# Patient Record
Sex: MALE | Race: WHITE | Employment: FULL TIME | URBAN - METROPOLITAN AREA
[De-identification: names, ages, dates, MRNs, and addresses within clinical notes are randomized per-mention and may not be internally consistent; named-entity substitution may affect disease eponyms.]

---

## 2017-12-12 ENCOUNTER — HOSPITAL ENCOUNTER (OUTPATIENT)
Dept: PHYSICAL THERAPY | Age: 52
Discharge: HOME | End: 2017-12-12
Payer: COMMERCIAL

## 2017-12-12 PROCEDURE — 97140 MANUAL THERAPY 1/> REGIONS: CPT | Performed by: PHYSICAL THERAPIST

## 2017-12-12 PROCEDURE — 97162 PT EVAL MOD COMPLEX 30 MIN: CPT | Performed by: PHYSICAL THERAPIST

## 2017-12-12 PROCEDURE — 97110 THERAPEUTIC EXERCISES: CPT | Performed by: PHYSICAL THERAPIST

## 2017-12-12 NOTE — PROGRESS NOTES
Request for use of Dry Needling/Intramuscular Manual Therapy  Patient: Margaret Watson     Referral Source: Calrene Jackson MD  Diagnosis: Lower back pain [M54.5]      : 1965  Date of initial visit: 17   Attended visits: 1  Missed Visits: 0    Based on findings from the physical therapy examination and evaluation, the evaluating therapist believes the patient, Margaret Watson  would benefit from including Dry Needling as part of the plan of care. Dry needling is a treatment technique utilized in conjunction with other PT interventions to inactivate myofascial trigger points and the pain and dysfunction they cause. Dry Needling is an advanced procedure that requires additional training including greater than 54 hours of intensive course work. Physical Therapists at [address] [place] [address] are certified through [address] [place] [address] courses for their education and are certified to perform treatments. PROCEDURE:   Solid filament sterile needle (typically 0.3mm/30 gauge) inserted into a trigger point   Repeated movements inactivate the trigger points, taking 30-60 seconds per site   Typically consists of 1 dry needling session per week and a possible second treatment including muscle re-education, flexibility, strengthening and other manual techniques to facilitate the benefits of dry needling     BENEFITS:   Inactivation of trigger points   Decreased pain   Increased muscle length   Improved movement patterns   Restoration of function POTENTIAL RISKS:   Post-needling soreness   Infection   Bruising/bleeding   Penetration of a nerve   Pneumothorax   All treating PTs have been thoroughly educated in avoiding adverse reactions    If you agree with this recommendation, please sign this form and fax it to us at [phone]. If you have questions or concerns regarding dry needling or any other treatment we may be providing, please contact us at [phone].     Thank you for allowing us to assist in the care of your patient. Joseph ANTONIO Liang    12/12/2017 9:28 AM     NOTE TO PHYSICIAN:  PLEASE COMPLETE THE ORDERS BELOW AND   FAX TO [facility]: [phone]  If you are unable to process this request in 24 hours please contact our office:   [phone] I have read the above request and AGREE to the recommendation of including dry needling as part of the plan of care. ? I have read the above request and DO NOT AGREE to including dry needling as part of the plan of care.   ? I have read the above report and request that my patient continue therapy with the following changes/special instructions:  ________________________________________________________________________    Physicians signature: _______________________________________________     Date: ______________Time:_______________

## 2017-12-12 NOTE — PROGRESS NOTES
[address] [facility] AT [facility]  [address] [place] [address],[address], [place]  Phone [phone]  Fax [phone] / [facility] PHYSICAL THERAPY SERVICES  Patient Name: Zoraida Guzmán : 1965   Medical   Diagnosis: Lower back pain [M54.5] Treatment Diagnosis: Low back pain   Onset Date: chronic     Referral Source: Mango Fitzgerald MD Start of [facility]): 2017   Prior Hospitalization: See medical history Provider #: [id number]   Prior Level of Function: Functional IND   Comorbidities: Heart disease, HBP, arthritis, R knee replacement   Medications: Verified on Patient Summary List   The Plan of Care and following information is based on the information from the initial evaluation.   =================================================================================  Assessment / key information:  Pt is a 47year old male with subjective complaints of R sided hip/low back pain that started 10-12 years ago that recently was flared up 3 months ago. He notes that when he was swinging a golf club 3 months ago he fell to his knees the pain hurts so bad. He had and MRI in 2017 which was negative. Currently he rates his pain a 2/10 on the R side of his low back. Functional limitations include lateral shifting like scooting over in bed. His pain is alleviated with stretching for 45 min. He has not been treated for the low back prior to today. 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 He notes that he tightens his core perform the lift and his back is not affected. Negative for red flags. FOTO: 72/100. Upon evaluation, pt presents with normalized gait pattern. AROM of the lumbar spine: flexion: 100%, extension: unable to extend past 0 neutral. Noted pivot point at T12. Negative repeated movement test of the lumbar spine. Negative for neurodynamic testing of the lumbar spine. Negative hip screen.  Noted tightness in the R QL/Lumbar paraspinals. Pt able to perform PPT with vc's. SI alignment: R upslip/posterior rotation corrected with MET. B hip strength: flexion: 4/.5, abduction: R: 3+/5, L:4+/5, extension: 4+/5. Pt would benefit from a course of skilled PT to address above deficits to return to PLOF symptom free.              =================================================================================  Eval Complexity: History: MEDIUM  Complexity : 1-2 comorbidities / personal factors will impact the outcome/ POC Exam:HIGH Complexity : 4+ Standardized tests and measures addressing body structure, function, activity limitation and / or participation in recreation  Presentation: MEDIUM Complexity : Evolving with changing characteristics  Clinical Decision Making:MEDIUM Complexity : FOTO score of 26-74Overall Complexity:MEDIUM  Problem List: pain affecting function, decrease ROM, decrease strength, decrease ADL/ functional abilitiies, decrease activity tolerance and decrease flexibility/ joint mobility   Treatment Plan may include any combination of the following: Therapeutic exercise, Therapeutic activities, Neuromuscular re-education, Physical agent/modality, Gait/balance training, Manual therapy and Patient education  Patient / Family readiness to learn indicated by: asking questions and trying to perform skills  Persons(s) to be included in education: patient (P)  Barriers to Learning/Limitations: None  Measures taken:    Patient Goal (s): Decrease pain if possible   Patient self reported health status: good  Rehabilitation Potential: good   Short Term Goals: To be accomplished in  1  weeks:  1. Pt will be IND and compliant with HEP for self-management of symptoms.  Long Term Goals: To be accomplished in  4  weeks:  1. Pt will present to clinic for 3 consecutive sessions with level pelvis as an indicator of improved stability. 2. Pt will improve B hip strength to 5/5 to improve gait stability.   3. Pt will be able to swing a golf club with no more than 2/10 pain to return to recreational activities symptom free. 4. Pt will improve FOTO score to at least 77/100 as a functional indicator of improved mobility. Frequency / Duration:   Patient to be seen  2  times per week for 4  weeks:  Patient / Caregiver education and instruction: exercises  G-Codes (GP): JOSE MARIA  Therapist Signature: Radha Bianchi DPT Date: 32/27/2561   Certification Period: NA Time: 9:10 AM   ===========================================================================================  I certify that the above Physical Therapy Services are being furnished while the patient is under my care. I agree with the treatment plan and certify that this therapy is necessary. Physician Signature:        Date:       Time:     Please sign and return to [facility] or you may fax the signed copy to [phone]. Thank you.

## 2017-12-12 NOTE — PROGRESS NOTES
PT DAILY TREATMENT NOTE     Patient Name: Anitra Murray  Date:2017  : 1965  [x]  Patient  Verified  Payor: /    In time:735  Out time:820  Total Treatment Time (min): 39  Visit #: 1 of     Treatment Area: Lower back pain [M54.5]    SUBJECTIVE  Pain Level (0-10 scale): 2/10  Any medication changes, allergies to medications, adverse drug reactions, diagnosis change, or new procedure performed?: [x] No    [] Yes (see summary sheet for update)  Subjective functional status/changes:   [] No changes reported  Pt is a 47year old male with subjective complaints of R sided hip/low back pain that started 10-12 years ago that recently was flared up 3 months ago. He notes that when he was swinging a golf club 3 months ago he fell to his knees the pain hurts so bad. He had and MRI in 2017 which was negative. Currently he rates his pain a 2/10 on the R side of his low back. Functional limitations include lateral shifting like scooting over in bed. His pain is alleviated with stretching for 45 min. He has not been treated for the low back prior to today. 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 He notes that he tightens his core perform the lift and his back is not affected. Negative for red flags. FOTO: 72/100. OBJECTIVE      15 min [x]Eval                  []Re-Eval       20 min Therapeutic Exercise:  [x] See flow sheet : established/educated HEP   Rationale: increase ROM and increase strength to improve the patients ability to improve postural stability with dynamic activities.      10 min Manual Therapy:  MET to correct R Upslip and posterior rotation   Rationale: decrease pain, increase ROM, increase tissue extensibility and decrease trigger points to improve walking tolerance          With   [] TE   [] TA   [] neuro   [] other: Patient Education: [x] Review HEP    [] Progressed/Changed HEP based on:   [] positioning   [] body mechanics   [] transfers   [] heat/ice application    [] other:      Other Objective/Functional Measures:   Upon evaluation, pt presents with normalized gait pattern. AROM of the lumbar spine: flexion: 100%, extension: unable to extend past 0 neutral. Noted pivot point at T12. Negative repeated movement test of the lumbar spine. Negative for neurodynamic testing of the lumbar spine. Negative hip screen. Noted tightness in the R QL/Lumbar paraspinals. Pt able to perform PPT with vc's. SI alignment: R upslip/posterior rotation corrected with MET. B hip strength: flexion: 4/.5, abduction: R: 3+/5, L:4+/5, extension: 4+/5. Pain Level (0-10 scale) post treatment: 1-2/10    ASSESSMENT/Changes in Function:   [x]  See Plan of Care  []  See progress note/recertification  []  See Discharge Summary         Progress towards goals / Updated goals:  PER POC    PLAN  [x]  Upgrade activities as tolerated     [x]  Continue plan of care  [x]  Update interventions per flow sheet       []  Discharge due to:_  [x]  Other: 2x/week for 4 weeks, add dry needling next session    Justification for Eval Code Complexity:  Patient History : chronic pain  Examination see exam   Clinical Presentation: evolving  Clinical Decision Making : FOTO : 72 /100      Olya Hinton DPT 12/12/2017 910  AM    No future appointments.

## 2017-12-13 ENCOUNTER — APPOINTMENT (OUTPATIENT)
Dept: PHYSICAL THERAPY | Age: 52
End: 2017-12-13
Payer: COMMERCIAL

## 2017-12-19 ENCOUNTER — HOSPITAL ENCOUNTER (OUTPATIENT)
Dept: PHYSICAL THERAPY | Age: 52
Discharge: HOME | End: 2017-12-19
Payer: COMMERCIAL

## 2017-12-19 PROCEDURE — 97140 MANUAL THERAPY 1/> REGIONS: CPT | Performed by: PHYSICAL THERAPIST

## 2017-12-19 NOTE — PROGRESS NOTES
PT DAILY TREATMENT NOTE -    Patient Name: Carley Ralph  Date:2017  : 1965  [x]  Patient  Verified  Payor: Bassam Nixon / Plan: [place] [employer]  [facility] Product Type: Commerical /    In time:728  Out time:800  Total Treatment Time (min): 32  Visit #: 2 of 8    Treatment Area: Lower back pain [M54.5]    SUBJECTIVE  Pain Level (0-10 scale): 2/10  Any medication changes, allergies to medications, adverse drug reactions, diagnosis change, or new procedure performed?: [x] No    [] Yes (see summary sheet for update)  Subjective functional status/changes:   [] No changes reported  \"I keep stretching for 45 min a day. Im going to start using the elliptical next week. I have to lose 20 pounds by march!      OBJECTIVE    Modality rationale: PD   Min Type Additional Details    [] Estim: []Att   []Unatt        []TENS instruct                  []IFC  []Premod   []NMES                     []Other:  []w/US   []w/ice   []w/heat  Position:  Location:    []  Traction: [] Cervical       []Lumbar                       [] Prone          []Supine                       []Intermittent   []Continuous Lbs:  [] before manual  [] after manual    []  Ultrasound: []Continuous   [] Pulsed                           []1MHz   []3MHz Location:  W/cm2:    []  Iontophoresis with dexamethasone         Location: [] Take home patch   [] In clinic    []  Ice     []  heat  []  Ice massage Position:  Location:    []  Laser  []  Other: Position:  Location:    []  Vasopneumatic Device Pressure:       [] lo [] med [] hi   Temperature: [] lo [] med [] hi   [] Skin assessment post-treatment:  []intact []redness- no adverse reaction    []redness - adverse reaction:     32 min Manual Therapy: Technique:      [x] S/DTM []IASTM []PROM [] Passive Stretching   [] Graston:  [] GT 1  [] GT 2 [] GT 3 [] GT 4 [] GT 4 [] GT 5  [] GT 6  [] Sweep [] Fan [] [place]  [] Brush   []  Swivel []J- Stroke [] Scoop []IFraming     [x]Manual TPR  [x] TDN (see objective; actual needle insertion time not billed)  []Jt manipulation:Gr I [] II []  III [] IV[] V[]  Treatment/Area:     Rationale:      decrease pain, increase ROM, increase tissue extensibility and decrease trigger points to improve patient's ability to perform job related tasks with reduced pain. With   [] TE   [] TA   [] neuro   [] other: Patient Education: [x] Review HEP    [] Progressed/Changed HEP based on:   [] positioning   [] body mechanics   [] transfers   [] heat/ice application    [] Graston Education: Explained the effects and benefits of Graston Technique therapy including potential for post treatment soreness and bruising. [] Other:      Dry Needling Procedure Note    Dry Needle Session Number:  1    Procedure: An intramuscular manual therapy (dry needling) and a neuro-muscular re-education treatment was done to deactivate myofascial trigger points, with a 15/30 gauge solid filament needle, under aseptic technique. Indication(s): [] Muscle spasms [] Myalgia/Myositis  [] Muscle cramps      [] Muscle imbalances [] TMD (TMJ) [] Myofascial pain & dysfunction     [] Other: __    Chart reviewed for the following:  IAlana DPT, have reviewed the medical history, summary list and precautions/contraindications for Kae Hatch.     TIME OUT performed immediately prior to start of procedure:  8 AM (enter time the timeout was completed)  Alana DAVISON DPT, have performed the following reviews on Kae Hatch prior to the start of the session:      [x] Patient was identified by name and date of birth    [x] Agreement on all muscles being treated was verified   [x] Purpose of dry needling, side effects, possible complications, risks and benefits were explained to the patient   [x] Procedure site(s) verified  [x] Patient was positioned for comfort and draped for privacy  [x] Informed Consent was signed (initial visit) and verified verbally (subsequent visits)  [x] Patient was instructed on the need to report the use of blood thinners and/or immunosuppressant medications  [x] How to respond to possible adverse effects of treatment  [x] Self treatment of post needling soreness: ice, heat (moist heat, heat wraps) and stretching  [x] Opportunity was given to ask any questions, all questions were answered            Treatment:  The following muscles were treated today:    Right: QL, Lumbar Paraspinals   Left:      Patients response to todays treatment:   [x]  LTRs  [x]  Muscle Relaxation  []  Pain Relief  []  Decreased Tinnitus  []  Decreased HAs [x]  Post needling soreness []  Increased ROM   []  Other:      Other Objective/Functional Measures:   + response in the above needled muscles     Pain Level (0-10 scale) post treatment: sore    ASSESSMENT/Changes in Function:   Pt continues to have significant mm restrictions in above muscles secondary to repetitive golf swing. Pt educated to try swinging opposite direction to prevent overuse. Pt deferred therex secondary to perform IND at home. Assess effects of IMT next session. Patient will continue to benefit from skilled PT services to modify and progress therapeutic interventions, address functional mobility deficits, address ROM deficits, address strength deficits, analyze and address soft tissue restrictions, analyze and cue movement patterns and assess and modify postural abnormalities to attain remaining goals.      []  See Plan of Care  []  See progress note/recertification  []  See Discharge Summary         Progress towards goals / Updated goals:  1st FU visit, initiated PT POC    PLAN  []  Upgrade activities as tolerated     [x]  Continue plan of care  []  Update interventions per flow sheet       []  Discharge due to:_  [x]  Other:assess effects of IMT next session      Marta Adrian DPT 12/19/2017  8:13 AM

## 2017-12-22 ENCOUNTER — HOSPITAL ENCOUNTER (OUTPATIENT)
Dept: PHYSICAL THERAPY | Age: 52
Discharge: HOME | End: 2017-12-22
Payer: COMMERCIAL

## 2017-12-22 PROCEDURE — 97140 MANUAL THERAPY 1/> REGIONS: CPT | Performed by: PHYSICAL THERAPIST

## 2017-12-22 NOTE — PROGRESS NOTES
PT DAILY TREATMENT NOTE -    Patient Name: Dre Mahmood  Date:2017  : 1965  [x]  Patient  Verified  Payor: Ca Poole / [place] [place] [employer]  [facility] Product Type: Commerical /    In time:700  Out time:710  Total Treatment Time (min): 10  Visit #: 3 of 8    Treatment Area: Lower back pain [M54.5]    SUBJECTIVE  Pain Level (0-10 scale): 0/10  Any medication changes, allergies to medications, adverse drug reactions, diagnosis change, or new procedure performed?: [x] No    [] Yes (see summary sheet for update)  Subjective functional status/changes:   [] No changes reported  Pt reports that he warmed up on the elliptical before appt. He is super busy at work and would like an abbreviated treatment.      OBJECTIVE    Modality rationale: PD   Min Type Additional Details    [] Estim: []Att   []Unatt        []TENS instruct                  []IFC  []Premod   []NMES                     []Other:  []w/US   []w/ice   []w/heat  Position:  Location:    []  Traction: [] Cervical       []Lumbar                       [] Prone          []Supine                       []Intermittent   []Continuous Lbs:  [] before manual  [] after manual    []  Ultrasound: []Continuous   [] Pulsed                           []1MHz   []3MHz Location:  W/cm2:    []  Iontophoresis with dexamethasone         Location: [] Take home patch   [] In clinic    []  Ice     []  heat  []  Ice massage Position:  Location:    []  Laser  []  Other: Position:  Location:    []  Vasopneumatic Device Pressure:       [] lo [] med [] hi   Temperature: [] lo [] med [] hi   [] Skin assessment post-treatment:  []intact []redness- no adverse reaction    []redness - adverse reaction:     10 min Manual Therapy: Technique:      [x] S/DTM []IASTM []PROM [] Passive Stretching   [] Graston:  [] GT 1  [] GT 2 [] GT 3 [] GT 4 [] GT 4 [] GT 5  [] GT 6  [] Sweep [] Fan [] [place]  [] Brush   []  Swivel []J- Stroke [] Scoop []IFraming     [x]Manual TPR  [x] TDN (see objective; actual needle insertion time not billed)  []Jt manipulation:Gr I [] II []  III [] IV[] V[]  Treatment/Area: R QL    Rationale:      decrease pain, increase ROM, increase tissue extensibility and decrease trigger points to improve patient's ability to perform job related tasks with reduced pain. With   [] TE   [] TA   [] neuro   [] other: Patient Education: [x] Review HEP    [] Progressed/Changed HEP based on:   [] positioning   [] body mechanics   [] transfers   [] heat/ice application    [] Graston Education: Explained the effects and benefits of Graston Technique therapy including potential for post treatment soreness and bruising. [] Other:      Dry Needling Procedure Note    Dry Needle Session Number:  2    Procedure: An intramuscular manual therapy (dry needling) and a neuro-muscular re-education treatment was done to deactivate myofascial trigger points, with a 15/30 gauge solid filament needle, under aseptic technique. Indication(s): [] Muscle spasms [] Myalgia/Myositis  [] Muscle cramps      [] Muscle imbalances [] TMD (TMJ) [] Myofascial pain & dysfunction     [] Other: __    Chart reviewed for the following:  ICassidy DPT, have reviewed the medical history, summary list and precautions/contraindications for Kae Hatch.     TIME OUT performed immediately prior to start of procedure:  710  AM (enter time the timeout was completed)  Cassidy DAVISON DPT, have performed the following reviews on Kae Hatch prior to the start of the session:      [x] Patient was identified by name and date of birth    [x] Agreement on all muscles being treated was verified   [x] Purpose of dry needling, side effects, possible complications, risks and benefits were explained to the patient   [x] Procedure site(s) verified  [x] Patient was positioned for comfort and draped for privacy  [x] Informed Consent was signed (initial visit) and verified verbally (subsequent visits)  [x] Patient was instructed on the need to report the use of blood thinners and/or immunosuppressant medications  [x] How to respond to possible adverse effects of treatment  [x] Self treatment of post needling soreness: ice, heat (moist heat, heat wraps) and stretching  [x] Opportunity was given to ask any questions, all questions were answered            Treatment:  The following muscles were treated today:    Right: QL, Lumbar Paraspinals   Left:      Patients response to todays treatment:   [x]  LTRs  [x]  Muscle Relaxation  []  Pain Relief  []  Decreased Tinnitus  []  Decreased HAs [x]  Post needling soreness []  Increased ROM   []  Other:      Other Objective/Functional Measures:   + response in the above needled muscles     Pain Level (0-10 scale) post treatment: 01/0    ASSESSMENT/Changes in Function:   Pt responded + to treatment as he had no low back pain. Therapy session was shorten today per patient request. Continue per current POC. Updated HEP next session     Patient will continue to benefit from skilled PT services to modify and progress therapeutic interventions, address functional mobility deficits, address ROM deficits, address strength deficits, analyze and address soft tissue restrictions, analyze and cue movement patterns and assess and modify postural abnormalities to attain remaining goals.      Progress towards goals / Updated goals:  Progressing well as pt had no pain this session    PLAN  []  Upgrade activities as tolerated     [x]  Continue plan of care  []  Update interventions per flow sheet       []  Discharge due to:_  [x]  Other:  Give progressed HEP next session     Radha Bianchi DPT 12/22/2017  8:37 AM

## 2017-12-27 ENCOUNTER — HOSPITAL ENCOUNTER (OUTPATIENT)
Dept: PHYSICAL THERAPY | Age: 52
Discharge: HOME | End: 2017-12-27
Payer: COMMERCIAL

## 2017-12-27 PROCEDURE — 97140 MANUAL THERAPY 1/> REGIONS: CPT

## 2017-12-27 NOTE — PROGRESS NOTES
PT DAILY TREATMENT NOTE -    Patient Name: Cheri Montemayor  Date:2017  : 1965  [x]  Patient  Verified  Payor: Ruddy Foy / Plan: [employer]  CAPITATED PT / Product Type: Commerical /    In time:700  Out time:710  Total Treatment Time (min): 10  Visit #: 3 of 8    Treatment Area: Lower back pain [M54.5]    SUBJECTIVE  Pain Level (0-10 scale): 0/10  Any medication changes, allergies to medications, adverse drug reactions, diagnosis change, or new procedure performed?: [x] No    [] Yes (see summary sheet for update)  Subjective functional status/changes:   [x] No changes reported    OBJECTIVE    Modality rationale: PD   Min Type Additional Details    [] Estim: []Att   []Unatt        []TENS instruct                  []IFC  []Premod   []NMES                     []Other:  []w/US   []w/ice   []w/heat  Position:  Location:    []  Traction: [] Cervical       []Lumbar                       [] Prone          []Supine                       []Intermittent   []Continuous Lbs:  [] before manual  [] after manual    []  Ultrasound: []Continuous   [] Pulsed                           []1MHz   []3MHz Location:  W/cm2:    []  Iontophoresis with dexamethasone         Location: [] Take home patch   [] In clinic    []  Ice     []  heat  []  Ice massage Position:  Location:    []  Laser  []  Other: Position:  Location:    []  Vasopneumatic Device Pressure:       [] lo [] med [] hi   Temperature: [] lo [] med [] hi   [] Skin assessment post-treatment:  []intact []redness- no adverse reaction    []redness - adverse reaction:     38/10 min Manual Therapy: Technique:      [x] S/DTM []IASTM []PROM [] Passive Stretching   [] Graston:  [] GT 1  [] GT 2 [] GT 3 [] GT 4 [] GT 4 [] GT 5  [] GT 6  [] Sweep [] Fan [] [place]  [] Brush   []  Swivel []J- Stroke [] Scoop []IFraming     [x]Manual TPR  [x] TDN (see objective; actual needle insertion time not billed)  []Jt manipulation:Gr I [] II []  III [] IV[] V[]  Treatment/Area: Bilat QL and paraspinals   Rationale:      decrease pain, increase ROM, increase tissue extensibility and decrease trigger points to improve patient's ability to perform job related tasks with reduced pain. With   [] TE   [] TA   [] neuro   [] other: Patient Education: [x] Review HEP    [] Progressed/Changed HEP based on:   [] positioning   [] body mechanics   [] transfers   [] heat/ice application    [] Graston Education: Explained the effects and benefits of Graston Technique therapy including potential for post treatment soreness and bruising. [] Other:      Dry Needling Procedure Note    Dry Needle Session Number:  2    Procedure: An intramuscular manual therapy (dry needling) and a neuro-muscular re-education treatment was done to deactivate myofascial trigger points, with a 15/30 gauge solid filament needle, under aseptic technique. Indication(s): [x] Muscle spasms [x] Myalgia/Myositis  [] Muscle cramps      [] Muscle imbalances [] TMD (TMJ) [x] Myofascial pain & dysfunction     [] Other: __    Chart reviewed for the following:  Trevor DAVISON, have reviewed the medical history, summary list and precautions/contraindications for Kae Hatch.     TIME OUT performed immediately prior to start of procedure:  4:05  AM (enter time the timeout was completed)  Trevor DAVISON, have performed the following reviews on Kae Hatch prior to the start of the session:      [x] Patient was identified by name and date of birth    [x] Agreement on all muscles being treated was verified   [x] Purpose of dry needling, side effects, possible complications, risks and benefits were explained to the patient   [x] Procedure site(s) verified  [x] Patient was positioned for comfort and draped for privacy  [x] Informed Consent was signed (initial visit) and verified verbally (subsequent visits)  [x] Patient was instructed on the need to report the use of blood thinners and/or immunosuppressant medications  [x] How to respond to possible adverse effects of treatment  [x] Self treatment of post needling soreness: ice, heat (moist heat, heat wraps) and stretching  [x] Opportunity was given to ask any questions, all questions were answered            Treatment:  The following muscles were treated today:    Right: QL, Lumbar Paraspinals   Left: QL, Lumbar Paraspinals     Patients response to todays treatment:   [x]  LTRs  [x]  Muscle Relaxation  []  Pain Relief  []  Decreased Tinnitus  []  Decreased HAs [x]  Post needling soreness []  Increased ROM   []  Other:      Other Objective/Functional Measures:   + response in the above needled muscles     Pain Level (0-10 scale) post treatment: 1-2/10    ASSESSMENT/Changes in Function:   Pt continues to have LBP rated at 2/10, however no spasms or active TrP this session. Pt likely to have muscle imbalances around core and hip to include core weakness. Next session assess hip flexors and glues. Continue per current POC. Updated HEP next session     Patient will continue to benefit from skilled PT services to modify and progress therapeutic interventions, address functional mobility deficits, address ROM deficits, address strength deficits, analyze and address soft tissue restrictions, analyze and cue movement patterns and assess and modify postural abnormalities to attain remaining goals. Progress towards goals / Updated goals: · Short Term Goals: To be accomplished in  1  weeks:  1. Pt will be IND and compliant with HEP for self-management of symptoms. · Long Term Goals: To be accomplished in  4  weeks:  1. Pt will present to clinic for 3 consecutive sessions with level pelvis as an indicator of improved stability. 2. Pt will improve B hip strength to 5/5 to improve gait stability. 3. Pt will be able to swing a golf club with no more than 2/10 pain to return to recreational activities symptom free.    4. Pt will improve FOTO score to at least 77/100 as a functional indicator of improved mobility.     PLAN  []  Upgrade activities as tolerated     [x]  Continue plan of care  []  Update interventions per flow sheet       []  Discharge due to:_  [x]  Other:  Give progressed HEP next session; assess hip flexors and glutes next session     Stephen Goyal, PT, DPT  12/27/2017

## 2017-12-28 ENCOUNTER — HOSPITAL ENCOUNTER (OUTPATIENT)
Dept: PHYSICAL THERAPY | Age: 52
Discharge: HOME | End: 2017-12-28
Payer: COMMERCIAL

## 2017-12-28 PROCEDURE — 97140 MANUAL THERAPY 1/> REGIONS: CPT

## 2017-12-28 PROCEDURE — 97110 THERAPEUTIC EXERCISES: CPT

## 2017-12-28 NOTE — PROGRESS NOTES
PT DAILY TREATMENT NOTE -    Patient Name: Yanni Marin  Date:2017  : 1965  [x]  Patient  Verified  Payor: Fior Patel / Plan: [employer]  CAPITATED PT / Product Type: Commerical /    In time: 10:36  Out time: 11:23  Total Treatment Time (min): 52  Visit #: 4 of 8    Treatment Area: Lower back pain [M54.5]    SUBJECTIVE  Pain Level (0-10 scale): achey  Any medication changes, allergies to medications, adverse drug reactions, diagnosis change, or new procedure performed?: [x] No    [] Yes (see summary sheet for update)  Subjective functional status/changes:   [x] No changes reported  [place]; more diffuse across the low back rather than QL    OBJECTIVE    Modality rationale: PD   Min Type Additional Details    [] Estim: []Att   []Unatt        []TENS instruct                  []IFC  []Premod   []NMES                     []Other:  []w/US   []w/ice   []w/heat  Position:  Location:    []  Traction: [] Cervical       []Lumbar                       [] Prone          []Supine                       []Intermittent   []Continuous Lbs:  [] before manual  [] after manual    []  Ultrasound: []Continuous   [] Pulsed                           []1MHz   []3MHz Location:  W/cm2:    []  Iontophoresis with dexamethasone         Location: [] Take home patch   [] In clinic    []  Ice     []  heat  []  Ice massage Position:  Location:    []  Laser  []  Other: Position:  Location:    []  Vasopneumatic Device Pressure:       [] lo [] med [] hi   Temperature: [] lo [] med [] hi   [] Skin assessment post-treatment:  []intact []redness- no adverse reaction    []redness - adverse reaction:     30/15 min Manual Therapy: Technique:      [x] S/DTM []IASTM []PROM [x] Passive Stretching   [] Graston:  [] GT 1  [] GT 2 [] GT 3 [] GT 4 [] GT 4 [] GT 5  [] GT 6  [] Sweep [] Fan [] [place]  [] Brush   []  Swivel []J- Stroke [] Scoop []IFraming     [x]Manual TPR  [x] TDN (see objective; actual needle insertion time not billed)  []Jt manipulation:Gr I [] II []  III [] IV[] V[]  Treatment/Area: Bilat QL and paraspinals   Rationale:      decrease pain, increase ROM, increase tissue extensibility and decrease trigger points to improve patient's ability to perform job related tasks with reduced pain. 17 min Therapeutic Exercise:  [x] See flow sheet :    Rationale: increase ROM and increase strength to improve the patients ability to improve postural stability with dynamic activities. With   [] TE   [] TA   [] neuro   [] other: Patient Education: [x] Review HEP    [] Progressed/Changed HEP based on:   [] positioning   [] body mechanics   [] transfers   [] heat/ice application    [] Graston Education: Explained the effects and benefits of Graston Technique therapy including potential for post treatment soreness and bruising. [] Other:      Dry Needling Procedure Note    Dry Needle Session Number:  3    Procedure: An intramuscular manual therapy (dry needling) and a neuro-muscular re-education treatment was done to deactivate myofascial trigger points, with a 15/30 gauge solid filament needle, under aseptic technique. Indication(s): [x] Muscle spasms [x] Myalgia/Myositis  [] Muscle cramps      [] Muscle imbalances [] TMD (TMJ) [x] Myofascial pain & dysfunction     [] Other: __    Chart reviewed for the following:  Meng DAVISON, have reviewed the medical history, summary list and precautions/contraindications for Kae Hatch.     TIME OUT performed immediately prior to start of procedure:  10:40 AM (enter time the timeout was completed)  Meng DAVISON, have performed the following reviews on Kae Hatch prior to the start of the session:      [x] Patient was identified by name and date of birth    [x] Agreement on all muscles being treated was verified   [x] Purpose of dry needling, side effects, possible complications, risks and benefits were explained to the patient   [x] Procedure site(s) verified  [x] Patient was positioned for comfort and draped for privacy  [x] Informed Consent was signed (initial visit) and verified verbally (subsequent visits)  [x] Patient was instructed on the need to report the use of blood thinners and/or immunosuppressant medications  [x] How to respond to possible adverse effects of treatment  [x] Self treatment of post needling soreness: ice, heat (moist heat, heat wraps) and stretching  [x] Opportunity was given to ask any questions, all questions were answered            Treatment:  The following muscles were treated today:    Right: Glute Med/Min, Lumbar Paraspinals   Left:      Patients response to todays treatment:   []  LTRs  [x]  Muscle Relaxation  [x]  Pain Relief  []  Decreased Tinnitus  []  Decreased HAs [x]  Post needling soreness []  Increased ROM   []  Other:      Other Objective/Functional Measures:   + response in the above needled muscles     Pain Level (0-10 scale) post treatment: 0/10    ASSESSMENT/Changes in Function:   Pt presents with overall reduced pain which is more diffuse across low back. Pt presents with Mod tight piriformis which may contribute to back pain. Continue per current POC. Added dead bugs to HEP     Patient will continue to benefit from skilled PT services to modify and progress therapeutic interventions, address functional mobility deficits, address ROM deficits, address strength deficits, analyze and address soft tissue restrictions, analyze and cue movement patterns and assess and modify postural abnormalities to attain remaining goals. Progress towards goals / Updated goals: · Short Term Goals: To be accomplished in  1  weeks:  1. Pt will be IND and compliant with HEP for self-management of symptoms. · Long Term Goals: To be accomplished in  4  weeks:  1. Pt will present to clinic for 3 consecutive sessions with level pelvis as an indicator of improved stability.   2. Pt will improve B hip strength to 5/5 to improve gait stability. 3. Pt will be able to swing a golf club with no more than 2/10 pain to return to recreational activities symptom free. 4. Pt will improve FOTO score to at least 77/100 as a functional indicator of improved mobility.     PLAN  [x]  Upgrade activities as tolerated     [x]  Continue plan of care  []  Update interventions per flow sheet       []  Discharge due to:_   []  Other:    Breezy Banks, PT, DPT  12/28/2017

## 2018-05-29 NOTE — PROGRESS NOTES
[address] [facility] AT [facility]  [address] [address], [place], [place]  Phone [phone]  Fax  SUMMARY  Patient Name: Shiraz Rader : 1965   Treatment/Medical Diagnosis: Lower back pain [M54.5]   Referral Source: Rogelio Adame MD     Date of Initial Visit: 17 Attended Visits: 4 Missed Visits: 0     SUMMARY OF TREATMENT  Pt seen for 4 therapy sessions for low back pain. Pt was last seen on 18. Spoke with patient and he requested self DC. Formal reassessment was not done secondary to unplanned DC. RECOMMENDATIONS  Discontinue therapy due to lack of attendance or compliance. If you have any questions/comments please contact us directly at [phone]. Thank you for allowing us to assist in the care of your patient.     Therapist Signature: Kang Russell DPT Date: 18     Time: 10:31 AM

## 2019-12-11 PROBLEM — M17.12 OSTEOARTHRITIS OF LEFT KNEE: Status: ACTIVE | Noted: 2019-12-11
